# Patient Record
Sex: MALE | Race: WHITE | NOT HISPANIC OR LATINO | Employment: UNEMPLOYED | URBAN - METROPOLITAN AREA
[De-identification: names, ages, dates, MRNs, and addresses within clinical notes are randomized per-mention and may not be internally consistent; named-entity substitution may affect disease eponyms.]

---

## 2019-10-10 ENCOUNTER — APPOINTMENT (OUTPATIENT)
Dept: GENERAL RADIOLOGY | Facility: HOSPITAL | Age: 39
End: 2019-10-10

## 2019-10-10 ENCOUNTER — HOSPITAL ENCOUNTER (EMERGENCY)
Facility: HOSPITAL | Age: 39
Discharge: ANOTHER HOSPITAL | End: 2019-10-10
Admitting: EMERGENCY MEDICINE

## 2019-10-10 VITALS
RESPIRATION RATE: 17 BRPM | WEIGHT: 178.79 LBS | HEIGHT: 71 IN | HEART RATE: 94 BPM | SYSTOLIC BLOOD PRESSURE: 142 MMHG | OXYGEN SATURATION: 98 % | DIASTOLIC BLOOD PRESSURE: 90 MMHG | BODY MASS INDEX: 25.03 KG/M2 | TEMPERATURE: 97.8 F

## 2019-10-10 DIAGNOSIS — M65.9 FLEXOR TENOSYNOVITIS OF FINGER: Primary | ICD-10-CM

## 2019-10-10 PROCEDURE — 99283 EMERGENCY DEPT VISIT LOW MDM: CPT

## 2019-10-10 PROCEDURE — 73130 X-RAY EXAM OF HAND: CPT

## 2019-10-10 RX ORDER — SODIUM CHLORIDE 0.9 % (FLUSH) 0.9 %
10 SYRINGE (ML) INJECTION AS NEEDED
Status: DISCONTINUED | OUTPATIENT
Start: 2019-10-10 | End: 2019-10-10

## 2019-10-10 NOTE — ED NOTES
Called [facility] to give report, spoke with Oliver LEMONS RN.   pt has been informed is getting admitted with DR Bloom.      Joycelyn Dubois RN  10/10/19 5610

## 2019-10-10 NOTE — ED NOTES
Called Dr. Anna Cano's office and left a message [phone]     Navarro Plascencia, PCT  10/10/19 8225

## 2019-10-10 NOTE — ED NOTES
Pt states a piece of nail pierced his right index finger about 3 weeks ago, while working. States it was very swollen he got all the metal out and took a razor blade to clean it out and swelling decreased. States it started to get a little better with pt cleaning it with peroxide and salt but in the past 2 days it started to get worse again with his middle finger to pinky finger freezing cold and index finger hot to touch with swelling, redness and numbness. Now states his thumb is starting to feel numb. Pt index finger bruised and appears like it has green pus under skin. States he tried to get the pus out but it only minimally comes out.     Pina Nicholas, RN  10/10/19 2625

## 2019-10-10 NOTE — ED PROVIDER NOTES
Subjective   Chief Complaint: Right index finger injury  Context: Patient reports that he had a metal fragment in his right index finger weeks ago that he dug out with a razor.  He reports 2 weeks ago it got warm hot swollen and he cut it and had some drainage.  He reports now the entire finger is swollen and painful and difficult to move.  Duration: As above  Timing: Constant  Severity: Moderate  Associated symptoms and or modifying factors: As above          History provided by:  Patient      Review of Systems   Constitutional: Negative for chills and fever.   Musculoskeletal: Positive for joint swelling.   Skin: Positive for rash.   Neurological: Negative for weakness and numbness.       No past medical history on file.    No Known Allergies    No past surgical history on file.    No family history on file.    Social History     Socioeconomic History   • Marital status: Legally      Spouse name: Not on file   • Number of children: Not on file   • Years of education: Not on file   • Highest education level: Not on file           Objective   Physical Exam  The patient is well-developed, well-nourished, alert, cooperative and in no acute distress.    HEENT exam is normocephalic and atraumatic. Pupils equal ,round, reactive to light. Extraocular muscles are intact.    Extremities: Right index finger with edema, erythema, pain on palpation of the flexor tendon, and limited range of motion.    Neurologic: Oriented x3 without focal neurological deficits.    Skin shows no rash, petechiae, or purpura.    Procedures           ED Course        Labs Reviewed - No data to display  Xr Hand 3+ View Right    Result Date: 10/10/2019  Soft tissue swelling. Otherwise negative.  Electronically Signed By-Susanna Moncada On:10/10/2019 4:20 PM This report was finalized on [id number] by  Susanna Moncada, .    Medications - No data to display  /87 (BP Location: Left arm, Patient Position: Sitting)   Pulse 91   Temp 98.4 °F  "(36.9 °C) (Oral)   Resp 16   Ht 180.3 cm (71\")   Wt 81.1 kg (178 lb 12.7 oz)   SpO2 99%   BMI 24.94 kg/m²             MDM  Number of Diagnoses or Management Options  Flexor tenosynovitis of finger:   Diagnosis management comments: MEDICAL DECISION  Comorbidities:denies  Differentials: Cellulitis, paronychia, flexor tenosynovitis; this list is not all inclusive and does not constitute the entirety of considered causes  Radiology interpretation:  Images reviewed by me and interpreted by radiologist, x-ray of the hand shows soft tissue swelling but no foreign bodies or fractures    Ortho on-call [facility] does not do hands.  Dr Cano no longer accepting patients.  Spoke with Yesika at [facility] who accepted patient to be seen now at [facility] for Dr Wolf Sosa.  he is to bring disc of x-ray and to remain n.p.o.  Patient will drive himself there.         Amount and/or Complexity of Data Reviewed  Tests in the radiology section of CPT®: reviewed and ordered        Final diagnoses:   Flexor tenosynovitis of finger              Randy Villanueva, APRN  10/10/19 1705    "

## 2021-07-12 ENCOUNTER — TRANSCRIBE ORDERS (OUTPATIENT)
Dept: ADMINISTRATIVE | Facility: HOSPITAL | Age: 41
End: 2021-07-12

## 2021-07-12 DIAGNOSIS — M54.50 ACUTE RIGHT-SIDED LOW BACK PAIN WITHOUT SCIATICA: Primary | ICD-10-CM

## 2021-07-12 DIAGNOSIS — R31.0 GROSS HEMATURIA: ICD-10-CM

## 2021-07-15 ENCOUNTER — HOSPITAL ENCOUNTER (OUTPATIENT)
Dept: CT IMAGING | Facility: HOSPITAL | Age: 41
Discharge: HOME | End: 2021-07-15

## 2021-07-15 DIAGNOSIS — R31.0 GROSS HEMATURIA: ICD-10-CM

## 2021-07-15 DIAGNOSIS — M54.50 ACUTE RIGHT-SIDED LOW BACK PAIN WITHOUT SCIATICA: ICD-10-CM

## 2021-07-15 PROCEDURE — 74176 CT ABD & PELVIS W/O CONTRAST: CPT

## 2022-01-19 ENCOUNTER — TRANSCRIBE ORDERS (OUTPATIENT)
Dept: ADMINISTRATIVE | Facility: HOSPITAL | Age: 42
End: 2022-01-19

## 2022-01-19 DIAGNOSIS — N20.0 KIDNEY STONE: Primary | ICD-10-CM

## 2022-01-24 ENCOUNTER — HOSPITAL ENCOUNTER (OUTPATIENT)
Dept: CT IMAGING | Facility: HOSPITAL | Age: 42
Discharge: HOME | End: 2022-01-24
Admitting: NURSE PRACTITIONER

## 2022-01-24 DIAGNOSIS — N20.0 KIDNEY STONE: ICD-10-CM

## 2022-01-24 PROCEDURE — 74176 CT ABD & PELVIS W/O CONTRAST: CPT

## 2022-02-24 RX ORDER — ACETAMINOPHEN 500 MG
1000 TABLET ORAL 2 TIMES DAILY
COMMUNITY

## 2022-02-24 RX ORDER — LORATADINE 10 MG/1
CAPSULE, LIQUID FILLED ORAL
COMMUNITY

## 2022-02-24 RX ORDER — TAMSULOSIN HYDROCHLORIDE 0.4 MG/1
1 CAPSULE ORAL DAILY
COMMUNITY

## 2022-02-24 RX ORDER — MOMETASONE FUROATE 50 UG/1
2 SPRAY, METERED NASAL DAILY
COMMUNITY

## 2022-03-01 ENCOUNTER — LAB (OUTPATIENT)
Dept: LAB | Facility: HOSPITAL | Age: 42
End: 2022-03-01

## 2022-03-01 LAB
ANION GAP SERPL CALCULATED.3IONS-SCNC: 10.9 MMOL/L (ref 5–15)
BUN SERPL-MCNC: 16 MG/DL (ref 6–20)
BUN/CREAT SERPL: 15 (ref 7–25)
CALCIUM SPEC-SCNC: 9.6 MG/DL (ref 8.6–10.5)
CHLORIDE SERPL-SCNC: 106 MMOL/L (ref 98–107)
CO2 SERPL-SCNC: 24.1 MMOL/L (ref 22–29)
CREAT SERPL-MCNC: 1.07 MG/DL (ref 0.76–1.27)
DEPRECATED RDW RBC AUTO: 42.1 FL (ref 37–54)
EGFRCR SERPLBLD CKD-EPI 2021: 90 ML/MIN/1.73
ERYTHROCYTE [DISTWIDTH] IN BLOOD BY AUTOMATED COUNT: 12.8 % (ref 12.3–15.4)
GLUCOSE SERPL-MCNC: 62 MG/DL (ref 65–99)
HCT VFR BLD AUTO: 42.9 % (ref 37.5–51)
HGB BLD-MCNC: 14.5 G/DL (ref 13–17.7)
MCH RBC QN AUTO: 30.5 PG (ref 26.6–33)
MCHC RBC AUTO-ENTMCNC: 33.8 G/DL (ref 31.5–35.7)
MCV RBC AUTO: 90.3 FL (ref 79–97)
PLATELET # BLD AUTO: 303 10*3/MM3 (ref 140–450)
PMV BLD AUTO: 10.1 FL (ref 6–12)
POTASSIUM SERPL-SCNC: 4.3 MMOL/L (ref 3.5–5.2)
RBC # BLD AUTO: 4.75 10*6/MM3 (ref 4.14–5.8)
SARS-COV-2 ORF1AB RESP QL NAA+PROBE: NOT DETECTED
SODIUM SERPL-SCNC: 141 MMOL/L (ref 136–145)
WBC NRBC COR # BLD: 6.07 10*3/MM3 (ref 3.4–10.8)

## 2022-03-01 PROCEDURE — C9803 HOPD COVID-19 SPEC COLLECT: HCPCS

## 2022-03-01 PROCEDURE — 85027 COMPLETE CBC AUTOMATED: CPT

## 2022-03-01 PROCEDURE — U0004 COV-19 TEST NON-CDC HGH THRU: HCPCS

## 2022-03-01 PROCEDURE — 80048 BASIC METABOLIC PNL TOTAL CA: CPT

## 2022-03-03 ENCOUNTER — ANESTHESIA EVENT (OUTPATIENT)
Dept: PERIOP | Facility: HOSPITAL | Age: 42
End: 2022-03-03

## 2022-03-03 ENCOUNTER — HOSPITAL ENCOUNTER (OUTPATIENT)
Facility: HOSPITAL | Age: 42
Setting detail: HOSPITAL OUTPATIENT SURGERY
Discharge: HOME | End: 2022-03-03
Attending: UROLOGY | Admitting: UROLOGY

## 2022-03-03 ENCOUNTER — ANESTHESIA (OUTPATIENT)
Dept: PERIOP | Facility: HOSPITAL | Age: 42
End: 2022-03-03

## 2022-03-03 VITALS
DIASTOLIC BLOOD PRESSURE: 84 MMHG | TEMPERATURE: 97.7 F | BODY MASS INDEX: 32.9 KG/M2 | SYSTOLIC BLOOD PRESSURE: 143 MMHG | HEART RATE: 79 BPM | RESPIRATION RATE: 18 BRPM | HEIGHT: 71 IN | WEIGHT: 235.01 LBS | OXYGEN SATURATION: 97 %

## 2022-03-03 DIAGNOSIS — N20.1 CALCULUS OF URETER: ICD-10-CM

## 2022-03-03 PROCEDURE — 82365 CALCULUS SPECTROSCOPY: CPT | Performed by: UROLOGY

## 2022-03-03 PROCEDURE — C1769 GUIDE WIRE: HCPCS | Performed by: UROLOGY

## 2022-03-03 PROCEDURE — 25010000002 FENTANYL CITRATE (PF) 100 MCG/2ML SOLUTION: Performed by: STUDENT IN AN ORGANIZED HEALTH CARE EDUCATION/TRAINING PROGRAM

## 2022-03-03 PROCEDURE — C1758 CATHETER, URETERAL: HCPCS | Performed by: UROLOGY

## 2022-03-03 PROCEDURE — C2617 STENT, NON-COR, TEM W/O DEL: HCPCS | Performed by: UROLOGY

## 2022-03-03 PROCEDURE — 25010000002 HYDROMORPHONE PER 4 MG: Performed by: STUDENT IN AN ORGANIZED HEALTH CARE EDUCATION/TRAINING PROGRAM

## 2022-03-03 PROCEDURE — 25010000002 ONDANSETRON PER 1 MG

## 2022-03-03 PROCEDURE — 25010000002 DEXAMETHASONE SODIUM PHOSPHATE 20 MG/5ML SOLUTION

## 2022-03-03 PROCEDURE — 25010000002 PROPOFOL 10 MG/ML EMULSION

## 2022-03-03 DEVICE — URETERAL STENT
Type: IMPLANTABLE DEVICE | Site: URETER | Status: FUNCTIONAL
Brand: PERCUFLEX™ PLUS

## 2022-03-03 RX ORDER — SODIUM CHLORIDE, SODIUM LACTATE, POTASSIUM CHLORIDE, CALCIUM CHLORIDE 600; 310; 30; 20 MG/100ML; MG/100ML; MG/100ML; MG/100ML
INJECTION, SOLUTION INTRAVENOUS CONTINUOUS PRN
Status: DISCONTINUED | OUTPATIENT
Start: 2022-03-03 | End: 2022-03-03 | Stop reason: SURG

## 2022-03-03 RX ORDER — SODIUM CHLORIDE, SODIUM LACTATE, POTASSIUM CHLORIDE, AND CALCIUM CHLORIDE .6; .31; .03; .02 G/100ML; G/100ML; G/100ML; G/100ML
20 INJECTION, SOLUTION INTRAVENOUS CONTINUOUS
Status: DISCONTINUED | OUTPATIENT
Start: 2022-03-03 | End: 2022-03-03 | Stop reason: HOSPADM

## 2022-03-03 RX ORDER — FENTANYL CITRATE 50 UG/ML
50 INJECTION, SOLUTION INTRAMUSCULAR; INTRAVENOUS
Status: DISCONTINUED | OUTPATIENT
Start: 2022-03-03 | End: 2022-03-03 | Stop reason: HOSPADM

## 2022-03-03 RX ORDER — ROCURONIUM BROMIDE 10 MG/ML
INJECTION, SOLUTION INTRAVENOUS AS NEEDED
Status: DISCONTINUED | OUTPATIENT
Start: 2022-03-03 | End: 2022-03-03 | Stop reason: SURG

## 2022-03-03 RX ORDER — PROPOFOL 10 MG/ML
VIAL (ML) INTRAVENOUS AS NEEDED
Status: DISCONTINUED | OUTPATIENT
Start: 2022-03-03 | End: 2022-03-03 | Stop reason: SURG

## 2022-03-03 RX ORDER — LIDOCAINE HYDROCHLORIDE 10 MG/ML
INJECTION, SOLUTION EPIDURAL; INFILTRATION; INTRACAUDAL; PERINEURAL AS NEEDED
Status: DISCONTINUED | OUTPATIENT
Start: 2022-03-03 | End: 2022-03-03 | Stop reason: SURG

## 2022-03-03 RX ORDER — OXYCODONE HYDROCHLORIDE 5 MG/1
10 TABLET ORAL EVERY 4 HOURS PRN
Status: DISCONTINUED | OUTPATIENT
Start: 2022-03-03 | End: 2022-03-03 | Stop reason: HOSPADM

## 2022-03-03 RX ORDER — HYDROMORPHONE HCL 110MG/55ML
0.5 PATIENT CONTROLLED ANALGESIA SYRINGE INTRAVENOUS
Status: COMPLETED | OUTPATIENT
Start: 2022-03-03 | End: 2022-03-03

## 2022-03-03 RX ORDER — DEXAMETHASONE SODIUM PHOSPHATE 4 MG/ML
INJECTION, SOLUTION INTRA-ARTICULAR; INTRALESIONAL; INTRAMUSCULAR; INTRAVENOUS; SOFT TISSUE AS NEEDED
Status: DISCONTINUED | OUTPATIENT
Start: 2022-03-03 | End: 2022-03-03 | Stop reason: SURG

## 2022-03-03 RX ORDER — ONDANSETRON 2 MG/ML
INJECTION INTRAMUSCULAR; INTRAVENOUS AS NEEDED
Status: DISCONTINUED | OUTPATIENT
Start: 2022-03-03 | End: 2022-03-03 | Stop reason: SURG

## 2022-03-03 RX ORDER — FENTANYL CITRATE 50 UG/ML
INJECTION, SOLUTION INTRAMUSCULAR; INTRAVENOUS AS NEEDED
Status: DISCONTINUED | OUTPATIENT
Start: 2022-03-03 | End: 2022-03-03 | Stop reason: SURG

## 2022-03-03 RX ORDER — ONDANSETRON 2 MG/ML
4 INJECTION INTRAMUSCULAR; INTRAVENOUS ONCE AS NEEDED
Status: DISCONTINUED | OUTPATIENT
Start: 2022-03-03 | End: 2022-03-03 | Stop reason: HOSPADM

## 2022-03-03 RX ADMIN — CEFAZOLIN SODIUM 2 G: 1 INJECTION, POWDER, FOR SOLUTION INTRAMUSCULAR; INTRAVENOUS at 12:26

## 2022-03-03 RX ADMIN — ONDANSETRON 4 MG: 2 INJECTION INTRAMUSCULAR; INTRAVENOUS at 12:26

## 2022-03-03 RX ADMIN — FENTANYL CITRATE 100 MCG: 50 INJECTION INTRAMUSCULAR; INTRAVENOUS at 12:20

## 2022-03-03 RX ADMIN — PROPOFOL 200 MG: 10 INJECTION, EMULSION INTRAVENOUS at 12:22

## 2022-03-03 RX ADMIN — SODIUM CHLORIDE, SODIUM LACTATE, POTASSIUM CHLORIDE, AND CALCIUM CHLORIDE: .6; .31; .03; .02 INJECTION, SOLUTION INTRAVENOUS at 12:18

## 2022-03-03 RX ADMIN — HYDROMORPHONE HYDROCHLORIDE 0.5 MG: 2 INJECTION, SOLUTION INTRAMUSCULAR; INTRAVENOUS; SUBCUTANEOUS at 14:08

## 2022-03-03 RX ADMIN — DEXAMETHASONE SODIUM PHOSPHATE 8 MG: 4 INJECTION, SOLUTION INTRAMUSCULAR; INTRAVENOUS at 12:22

## 2022-03-03 RX ADMIN — SODIUM CHLORIDE, POTASSIUM CHLORIDE, SODIUM LACTATE AND CALCIUM CHLORIDE 20 ML/HR: 600; 310; 30; 20 INJECTION, SOLUTION INTRAVENOUS at 11:32

## 2022-03-03 RX ADMIN — OXYCODONE 10 MG: 5 TABLET ORAL at 14:45

## 2022-03-03 RX ADMIN — HYDROMORPHONE HYDROCHLORIDE 0.5 MG: 2 INJECTION, SOLUTION INTRAMUSCULAR; INTRAVENOUS; SUBCUTANEOUS at 13:54

## 2022-03-03 RX ADMIN — PROPOFOL 40 MG: 10 INJECTION, EMULSION INTRAVENOUS at 12:38

## 2022-03-03 RX ADMIN — FENTANYL CITRATE 50 MCG: 50 INJECTION INTRAMUSCULAR; INTRAVENOUS at 12:57

## 2022-03-03 RX ADMIN — FENTANYL CITRATE 50 MCG: 50 INJECTION INTRAMUSCULAR; INTRAVENOUS at 12:42

## 2022-03-03 RX ADMIN — LIDOCAINE HYDROCHLORIDE 100 MG: 10 INJECTION, SOLUTION EPIDURAL; INFILTRATION; INTRACAUDAL; PERINEURAL at 12:21

## 2022-03-03 RX ADMIN — PROPOFOL 100 MG: 10 INJECTION, EMULSION INTRAVENOUS at 13:04

## 2022-03-03 RX ADMIN — ROCURONIUM BROMIDE 20 MG: 10 INJECTION INTRAVENOUS at 12:22

## 2022-03-03 RX ADMIN — HYDROMORPHONE HYDROCHLORIDE 0.5 MG: 2 INJECTION, SOLUTION INTRAMUSCULAR; INTRAVENOUS; SUBCUTANEOUS at 14:25

## 2022-03-03 RX ADMIN — HYDROMORPHONE HYDROCHLORIDE 0.5 MG: 2 INJECTION, SOLUTION INTRAMUSCULAR; INTRAVENOUS; SUBCUTANEOUS at 14:17

## 2022-03-03 RX ADMIN — SUGAMMADEX 200 MG: 100 INJECTION, SOLUTION INTRAVENOUS at 13:05

## 2022-03-03 RX ADMIN — PROPOFOL 200 MG: 10 INJECTION, EMULSION INTRAVENOUS at 12:21

## 2022-03-03 NOTE — ANESTHESIA PREPROCEDURE EVALUATION
Anesthesia Evaluation     Patient summary reviewed and Nursing notes reviewed   NPO Solid Status: > 8 hours  NPO Liquid Status: > 8 hours           Airway   Mallampati: II  TM distance: >3 FB  Neck ROM: full  No difficulty expected  Dental - normal exam   (+) upper dentures    Pulmonary - negative pulmonary ROS and normal exam   Cardiovascular - negative cardio ROS and normal exam        Neuro/Psych- negative ROS  GI/Hepatic/Renal/Endo    (+) obesity,   hepatitis C, renal disease stones,     Musculoskeletal (-) negative ROS    Abdominal  - normal exam    Bowel sounds: normal.   Substance History - negative use     OB/GYN negative ob/gyn ROS         Other                      Anesthesia Plan    ASA 2     general     intravenous induction     Anesthetic plan, all risks, benefits, and alternatives have been provided, discussed and informed consent has been obtained with: patient.    Plan discussed with CRNA and CAA.        CODE STATUS:

## 2022-03-03 NOTE — ANESTHESIA POSTPROCEDURE EVALUATION
Patient: Jcarlos Franklin    Procedure Summary     Date: 03/03/22 Room / Location: [facility] OR 01 / [facility] MAIN OR    Anesthesia Start: 1218 Anesthesia Stop: 1318    Procedure: CYSTOSCOPY URETEROSCOPY STONE BASKET EXTRACTION  STENT INSERTION (Right Ureter) Diagnosis:       Calculus of ureter      (Calculus of ureter [N20.1])    Surgeons: Tyshawn Miranda MD Provider: Bret Veliz MD    Anesthesia Type: general ASA Status: 2          Anesthesia Type: general    Vitals  Vitals Value Taken Time   /58 03/03/22 1359   Temp 98.3 °F (36.8 °C) 03/03/22 1317   Pulse 68 03/03/22 1401   Resp 18 03/03/22 1347   SpO2 99 % 03/03/22 1401   Vitals shown include unvalidated device data.        Post Anesthesia Care and Evaluation    Patient location during evaluation: PACU  Patient participation: complete - patient participated  Level of consciousness: awake  Pain scale: See nurse's notes for pain score.  Pain management: adequate  Airway patency: patent  Anesthetic complications: No anesthetic complications  PONV Status: none  Cardiovascular status: acceptable  Respiratory status: acceptable  Hydration status: acceptable    Comments: Patient seen and examined postoperatively; vital signs stable; SpO2 greater than or equal to 90%; cardiopulmonary status stable; nausea/vomiting adequately controlled; pain adequately controlled; no apparent anesthesia complications; patient discharged from anesthesia care when discharge criteria were met

## 2022-03-03 NOTE — OP NOTE
CYSTOSCOPY URETEROSCOPY RETROGRADE PYELOGRAM HOLMIUM LASER STENT INSERTION  Procedure Report    Patient Name:  Jcarlos Franklin  YOB: 1980    Date of Surgery:  3/3/2022     Indications: 41-year-old male with right flank pain.  CT scan shows a 6 to 7 mm distal right ureteral calculus.  He now presents for treatment of this.    Pre-op Diagnosis:   Calculus of ureter [N20.1] right       Post-Op Diagnosis Codes:     * Calculus of ureter [N20.1] right, meatal stenosis    Procedure/CPT® Codes:      Procedure(s):  CYSTOSCOPY, right URETEROSCOPIC STONE BASKET EXTRACTION, right ureteroscopic laser lithotripsy, right ureteral STENT INSERTION, dilation of meatal stenosis    Staff:  Surgeon(s):  Tyshawn Miranda MD            was responsible for performing the following activities: None and their skilled assistance was necessary for the success of this case.    Anesthesia: General    Estimated Blood Loss: minimal    Implants:    Implant Name Type Inv. Item Serial No.  Lot No. LRB No. Used Action   STNT PERCUFLX NO [id number] - [id number] Stent STNT PERCUFLX NO GW [id number]  AirCell [id number] Right 1 Implanted       Specimen:          ID Type Source Tests Collected by Time   1 (Not marked as sent) : right ureteral stone Calculus Ureter, Right STONE ANALYSIS Tyshawn Miranda MD 3/3/2022 1255         Findings: Large distal right ureteral calculus    Complications: None    Description of Procedure: Patient induced with general anesthesia and placed in dorsolithotomy position.  Prepped and draped in sterile fashion.  22 Portuguese cystoscope could not initially pass.  Therefore the meatus was dilated to 28 Portuguese using metal sounds.  I was then able to pass the 22 Portuguese cystoscope without any difficulty.  Remainder the urethra was normal.  Prostate does not show any significant enlargement.  Bladder is normal throughout.  Both ureteral orifice visualized and are normal.  Guidewires passed through the  cystoscope and up the right ureter under fluoroscopy.  Dual-lumen catheter used to dilate the distal ureter.  Cystoscope was removed and a rigid ureteroscope was passed next the wire and up to the level of the stone in the distal right ureter.  The stone was too large to be removed intact.  Holmium laser was used to fragment the stone into multiple smaller pieces.  These pieces were then removed using a nitinol basket.  Because of the edema present it was decided to place a stent.  The indwelling wire was backloaded through the cystoscope and a 6 Welsh by 26 cm ureteral stent was passed over the wire and up into the right kidney under fluoroscopy.  The wire was removed.  A good curl seen in the right kidney under fluoroscopy good curl seen the bladder endoscopically.  The patient bladder was emptied.  The cystoscope was removed.  The patient was taken out of the dorsolithotomy position and awakened from general anesthesia.  He was transported to the postanesthesia care unit in stable condition having tolerated the procedure well without any complications.    Patient will follow up in 1 week for a cystoscopy with stent removal as part of a planned staged procedure.      Tyshawn Miranda MD     Date: 3/3/2022  Time: 13:08 EST

## 2022-03-03 NOTE — ANESTHESIA PROCEDURE NOTES
Airway  Urgency: elective    Date/Time: 3/3/2022 12:24 PM  Airway not difficult    General Information and Staff    Patient location during procedure: OR  Anesthesiologist: Bret Veliz MD  CRNA: Gretchen Hardy CAA    Indications and Patient Condition  Indications for airway management: airway protection    Preoxygenated: yes  Mask difficulty assessment: 1 - vent by mask    Final Airway Details  Final airway type: supraglottic airway      Successful airway: classic  Size 5    Number of attempts at approach: 1  Assessment: lips, teeth, and gum same as pre-op and atraumatic intubation

## 2022-03-03 NOTE — H&P
Urology History and Physical    Patient:Jcarlos Franklin  :1980   Room:[facility] MAIN OR/MAIN OR   Admit Date3/3/2022  Age:41 y.o.      SEX:male      DOS:3/3/2022      MR:[id number]      Visit:[id number]       Chief complaint right ureteral calculus    Subjective     Patient is a 41 y.o. male presents with right flank pain which is intermittent. Onset of symptoms was 1 month ago.  Symptoms are associated with gross hematuria.  Symptoms are aggravated by nothing.   Symptoms improve with pain med. Severity varies. Quality colicky.  CT scan shows 6 mm stone in the distal right ureter    Review of Systems  10 point review of systems were reviewed and are negative except for:  Constitution:  positive for See HPI    History  Past Medical History:   Diagnosis Date   • Hepatitis C    • Kidney stones    • Seasonal allergies      Past Surgical History:   Procedure Laterality Date   • CHOLECYSTECTOMY     • CYSTOSCOPY W/ LASER LITHOTRIPSY       Social History     Socioeconomic History   • Marital status: Legally    Tobacco Use   • Smoking status: Former Smoker     Quit date:      Years since quittin.   Substance and Sexual Activity   • Alcohol use: Not Currently   • Drug use: Not Currently     History reviewed. No pertinent family history.  Allergy  No Known Allergies  Prior to Admission medications    Medication Sig Start Date End Date Taking? Authorizing Provider   acetaminophen (TYLENOL) 500 MG tablet Take 1,000 mg by mouth 2 (Two) Times a Day.   Yes Yobany Block MD   Loratadine (Claritin) 10 MG capsule Take  by mouth.   Yes Yobany Block MD   mometasone (NASONEX) 50 MCG/ACT nasal spray 2 sprays into the nostril(s) as directed by provider Daily.   Yes Yobany Block MD   tamsulosin (FLOMAX) 0.4 MG capsule 24 hr capsule Take 1 capsule by mouth Daily.   Yes Yobany Block MD         Objective     tMax 24 hours:  Temp (24hrs), Av.4 °F (36.9 °C), Min:98.4 °F (36.9 °C),  Max:98.4 °F (36.9 °C)    Vital Sign Ranges:  Temp:  [98.4 °F (36.9 °C)] 98.4 °F (36.9 °C)  Heart Rate:  [72] 72  Resp:  [18] 18  BP: (124)/(75) 124/75  Intake and Output Last 3 Shifts:  No intake/output data recorded.    Physical Exam:   General Appearance: alert, appears stated age and cooperative  Head: normocephalic, without obvious abnormality and atraumatic  Eyes: lids and lashes normal, conjunctivae and sclerae normal, no icterus, no pallor and corneas clear  Lungs: respirations regular, respirations even and respirations unlabored  Heart: regular rhythm & normal rate  Abdomen: soft non-tender, no guarding and no rebound tenderness  Extremities: moves extremities well, no edema, no cyanosis and no redness  Skin: no bleeding, bruising or rash  Neurologic: Mental Status orientated to person, place, time and situation    Results Review:     Lab Results (last 24 hours)     ** No results found for the last 24 hours. **         No results found for: URINECX     Imaging Results (Last 7 Days)     ** No results found for the last 168 hours. **          Inpatient Meds:   Scheduled Meds:    Continuous Infusions:lactated ringers, 20 mL/hr, Last Rate: 20 mL/hr (03/03/22 1132)       PRN Meds:.      Assessment/Plan     Active Problems:    * No active hospital problems. *    Right ureteral calculus    Plan  Cystoscopy, right ureteroscopic stone extraction, right ureteroscopic laser lithotripsy, placement of right ureteral stent.  Risks, benefits, alternatives have been discussed with the patient he wishes to proceed.      I discussed the patients findings and my recommendations with patient    Tyshawn Miranda MD  03/03/22  12:22 EST

## 2022-03-08 LAB
CALCIUM OXALATE DIHYDRATE MFR STONE IR: 20 %
COLOR STONE: NORMAL
COM MFR STONE: 75 %
COMPN STONE: NORMAL
HYDROXYAPATITE 24H ENGDIFF UR: 5 %
LABORATORY COMMENT REPORT: NORMAL
LABORATORY COMMENT REPORT: NORMAL
Lab: NORMAL
Lab: NORMAL
PHOTO: NORMAL
SIZE STONE: NORMAL MM
SPEC SOURCE SUBJ: NORMAL
WT STONE: 41 MG

## 2024-01-05 ENCOUNTER — TRANSCRIBE ORDERS (OUTPATIENT)
Dept: ADMINISTRATIVE | Facility: HOSPITAL | Age: 44
End: 2024-01-05
Payer: MEDICAID

## 2024-01-05 DIAGNOSIS — N20.0 KIDNEY STONE: Primary | ICD-10-CM

## (undated) DEVICE — DUAL LUMEN URETERAL CATHETER

## (undated) DEVICE — PK CYSTO 50

## (undated) DEVICE — GLV SURG SIGNATURE ESSENTIAL PF LTX SZ7

## (undated) DEVICE — NITINOL STONE RETRIEVAL BASKET: Brand: ZERO TIP

## (undated) DEVICE — GW FIX CORE JB FLX PTFE .035 15X145CM

## (undated) DEVICE — FIBR LASR HOLMIUM 200 MICRON DISP

## (undated) DEVICE — KT SURG TURNOVER 050

## (undated) DEVICE — NITINOL WIRE WITH HYDROPHILIC TIP: Brand: SENSOR

## (undated) DEVICE — SOL IRRG H2O BG 3000ML STRL